# Patient Record
Sex: MALE | Race: WHITE | NOT HISPANIC OR LATINO | Employment: FULL TIME | URBAN - METROPOLITAN AREA
[De-identification: names, ages, dates, MRNs, and addresses within clinical notes are randomized per-mention and may not be internally consistent; named-entity substitution may affect disease eponyms.]

---

## 2018-02-16 ENCOUNTER — APPOINTMENT (OUTPATIENT)
Dept: URGENT CARE | Facility: CLINIC | Age: 53
End: 2018-02-16
Payer: OTHER MISCELLANEOUS

## 2018-02-16 PROCEDURE — G0381 LEV 2 HOSP TYPE B ED VISIT: HCPCS | Performed by: EMERGENCY MEDICINE

## 2018-02-16 PROCEDURE — 99282 EMERGENCY DEPT VISIT SF MDM: CPT | Performed by: EMERGENCY MEDICINE

## 2021-05-01 ENCOUNTER — IMMUNIZATION (OUTPATIENT)
Dept: IMMUNIZATION | Facility: CLINIC | Age: 56
End: 2021-05-01

## 2021-06-12 ENCOUNTER — IMMUNIZATION (OUTPATIENT)
Dept: IMMUNIZATION | Facility: CLINIC | Age: 56
End: 2021-06-12

## 2022-07-12 ENCOUNTER — HOSPITAL ENCOUNTER (EMERGENCY)
Facility: HOSPITAL | Age: 57
Discharge: HOME | End: 2022-07-12
Attending: EMERGENCY MEDICINE
Payer: OTHER MISCELLANEOUS

## 2022-07-12 ENCOUNTER — APPOINTMENT (EMERGENCY)
Dept: RADIOLOGY | Facility: HOSPITAL | Age: 57
End: 2022-07-12
Payer: OTHER MISCELLANEOUS

## 2022-07-12 VITALS
TEMPERATURE: 98 F | HEIGHT: 68 IN | OXYGEN SATURATION: 97 % | SYSTOLIC BLOOD PRESSURE: 158 MMHG | BODY MASS INDEX: 31.07 KG/M2 | DIASTOLIC BLOOD PRESSURE: 89 MMHG | WEIGHT: 205 LBS | RESPIRATION RATE: 20 BRPM | HEART RATE: 99 BPM

## 2022-07-12 DIAGNOSIS — S51.811A LACERATION OF RIGHT FOREARM, INITIAL ENCOUNTER: Primary | ICD-10-CM

## 2022-07-12 PROCEDURE — 0JQG0ZZ REPAIR RIGHT LOWER ARM SUBCUTANEOUS TISSUE AND FASCIA, OPEN APPROACH: ICD-10-PCS | Performed by: EMERGENCY MEDICINE

## 2022-07-12 PROCEDURE — 90715 TDAP VACCINE 7 YRS/> IM: CPT

## 2022-07-12 PROCEDURE — 3E0234Z INTRODUCTION OF SERUM, TOXOID AND VACCINE INTO MUSCLE, PERCUTANEOUS APPROACH: ICD-10-PCS | Performed by: EMERGENCY MEDICINE

## 2022-07-12 PROCEDURE — 90471 IMMUNIZATION ADMIN: CPT

## 2022-07-12 PROCEDURE — 63600000 HC DRUGS/DETAIL CODE

## 2022-07-12 PROCEDURE — 73090 X-RAY EXAM OF FOREARM: CPT | Mod: RT

## 2022-07-12 PROCEDURE — 12004 RPR S/N/AX/GEN/TRK7.6-12.5CM: CPT

## 2022-07-12 PROCEDURE — 99283 EMERGENCY DEPT VISIT LOW MDM: CPT | Mod: 25

## 2022-07-12 RX ADMIN — TETANUS TOXOID, REDUCED DIPHTHERIA TOXOID AND ACELLULAR PERTUSSIS VACCINE, ADSORBED 0.5 ML: 5; 2.5; 8; 8; 2.5 SUSPENSION INTRAMUSCULAR at 18:09

## 2022-07-12 ASSESSMENT — ENCOUNTER SYMPTOMS
ARTHRALGIAS: 0
WOUND: 1
NUMBNESS: 0
WEAKNESS: 0
JOINT SWELLING: 0

## 2022-07-12 NOTE — ED ATTESTATION NOTE
I have personally seen and examined the patient.  I reviewed and agree with physician assistant / nurse practitioner’s assessment and plan of care, with the following exceptions: None  My examination, assessment, and plan of care of Austin Handy is as follows:        57-year-old male suffered a laceration to his right forearm when a metal box with rounded edges fell onto his right arm.  Patient is unsure how the laceration occurred.  Patient has normal movement of his hand.  On exam patient is awake alert in no acute distress.  Patient is a large deep laceration to the dorsal surface of his right forearm.  He is neurovascularly intact distally.  X-rays were obtained which show no bony injury.  Patient's wound was cleaned and repaired by the PA.  Tetanus prophylaxis was given.  Patient will be discharged.       Bri Barnes MD  07/12/22 9771

## 2022-07-12 NOTE — DISCHARGE INSTRUCTIONS
Please follow up with Occupational Health tomorrow. Please return to the ED for any worsening, new, or concerning symptoms such as fever, chest pain, shortness of breath, abdominal pain, vomiting, signs of infection, etc    After 24 hours you may remove your dressing. Clean your wound daily with mild soap and water and dressed with topical antibiotic of your choosing.  You may also cover with bandage (adaptic, telfa, roll gauze, ace bandage).  Please be sure to change the dressing at least once a day.  Please do not submerge wound in water until after sutures are removed.  You will need your stitches removed in 10-14 days.  You may return here or have them removed at your primary care doctor's office or in urgent care.  As we discussed, we cannot guarantee there will not be a scar, and there is always a risk of retained foreign body that was not visible on your imaging today. There is also a possibility of infection or you may need additional repair/procedures.  Please be aware of any signs or symptoms of infection such as fever, redness, yellow discharge etc.  If any of these occur please seek immediate medical attention.    Please follow RICE - rest, ice, compression, elevation - protocol. Rest your extremity. You may apply ice. Please do not apply directly to the skin. Please do not leave in place while asleep. Please place for 10-15 minutes and then remove it for 10-15 minutes. You may apply a compressive ACE bandage to help reduce swelling. Please elevate your extremity above heart level to reduce swelling.

## 2022-07-12 NOTE — ED PROVIDER NOTES
Emergency Medicine Note  HPI   HISTORY OF PRESENT ILLNESS       History provided by:  Patient and medical records   used: No      57-year-old RHD male with no pertinent PMH presents to the ED via private vehicle for evaluation of right forearm laceration. Patient states a metal box fell on his right arm. He sustained a laceration to his forearm. Denies pain, numbness, weakness. Unknown last tetanus.      Patient History   PAST HISTORY     Reviewed from Nursing Triage:         History reviewed. No pertinent past medical history.    History reviewed. No pertinent surgical history.    History reviewed. No pertinent family history.    Social History     Tobacco Use   • Smoking status: Smoker, Current Status Unknown     Types: Cigarettes   Substance Use Topics   • Alcohol use: Never   • Drug use: Never         Review of Systems   REVIEW OF SYSTEMS     Review of Systems   Musculoskeletal: Negative for arthralgias and joint swelling.   Skin: Positive for wound.   Neurological: Negative for weakness and numbness.         VITALS     ED Vitals    Date/Time Temp Pulse Resp BP SpO2 [facility]   07/12/22 1534 36.7 °C (98 °F) 99 20 158/89 97 % MMM        Pulse Ox %: 97 % (07/12/22 1643)  Pulse Ox Interpretation: Normal (07/12/22 1643)  Heart Rate: 99 (07/12/22 1643)        Physical Exam   PHYSICAL EXAM     Physical Exam  Vitals and nursing note reviewed.   Constitutional:       General: He is not in acute distress.     Appearance: He is well-developed.   HENT:      Head: Atraumatic.   Eyes:      Conjunctiva/sclera: Conjunctivae normal.   Cardiovascular:      Pulses: Normal pulses.   Pulmonary:      Effort: Pulmonary effort is normal.   Musculoskeletal:         General: No deformity.      Comments: No bony tenderness. No step-off/deformity. No crepitus. ROM intact in R wrist/hand/fingers. No sign of tendon injury.   Skin:     General: Skin is warm and dry.      Comments: 10 cm gaping R forearm laceration    Neurological:      General: No focal deficit present.      Mental Status: He is alert and oriented to person, place, and time.      GCS: GCS eye subscore is 4. GCS verbal subscore is 5. GCS motor subscore is 6.      Comments: Distal NV intact.   Psychiatric:         Mood and Affect: Mood normal.         Behavior: Behavior normal.           PROCEDURES     Laceration Repair    Date/Time: 7/12/2022 6:39 PM  Performed by: Joaquina Souza PA C  Authorized by: Bri Barnes MD     Consent:     Consent obtained:  Verbal    Consent given by:  Patient    Risks, benefits, and alternatives were discussed: yes      Risks discussed:  Infection, need for additional repair, nerve damage, pain, poor cosmetic result, poor wound healing, retained foreign body, tendon damage and vascular damage    Alternatives discussed:  No treatment  Universal protocol:     Procedure explained and questions answered to patient or proxy's satisfaction: yes      Imaging studies available: yes      Patient identity confirmed:  Verbally with patient and arm band  Anesthesia:     Anesthesia method:  Local infiltration    Local anesthetic:  Lidocaine 1% w/o epi  Laceration details:     Location:  Shoulder/arm    Shoulder/arm location:  R lower arm    Length (cm):  10  Pre-procedure details:     Preparation:  Patient was prepped and draped in usual sterile fashion and imaging obtained to evaluate for foreign bodies  Exploration:     Limited defect created (wound extended): yes      Hemostasis achieved with:  Direct pressure    Imaging obtained: x-ray      Imaging outcome: foreign body not noted      Wound exploration: wound explored through full range of motion and entire depth of wound visualized      Wound extent: no foreign bodies/material noted, no tendon damage noted and no underlying fracture noted      Contaminated: no    Treatment:     Area cleansed with:  Saline, Shur-Clens and povidone-iodine    Amount of cleaning:  Standard    Irrigation  solution:  Sterile saline    Irrigation volume:  500    Irrigation method:  Syringe  Skin repair:     Repair method:  Sutures    Suture size:  4-0    Suture material:  Nylon    Suture technique:  Simple interrupted    Number of sutures:  10  Approximation:     Approximation:  Close  Repair type:     Repair type:  Simple  Post-procedure details:     Dressing:  Antibiotic ointment and non-adherent dressing    Procedure completion:  Tolerated well, no immediate complications         DATA     Results     None          Imaging Results          X-RAY FOREARM RIGHT 2 VIEWS (Final result)  Result time 07/12/22 17:20:20    Final result                 Impression:    IMPRESSION:  No acute fracture or dislocation of the right radius and ulna.  Large soft tissue laceration in the right forearm.  No radiopaque foreign body.             Narrative:    CLINICAL HISTORY: Injury.    COMMENT: 2 views of the right radius and ulna are submitted for review. There  are no prior examinations available for comparison.    There is no evidence for acute fracture or dislocation. There is no evidence for  joint effusion.  Large soft tissue laceration in the dorsal aspect of the right  forearm.                                No orders to display       Scoring tools                                 ED Course & MDM   MDM / ED COURSE / CLINICAL IMPRESSIONS / DISPO     [facility]    ED Course as of 07/12/22 1905   Tue Jul 12, 2022   1654 Impression: R forearm laceration    Plan: Xray, lac repair [SG]   1725 X-RAY FOREARM RIGHT 2 VIEWS  IMPRESSION:  No acute fracture or dislocation of the right radius and ulna.  Large soft tissue laceration in the right forearm.  No radiopaque foreign body. [SG]   1837 10, 4-0 nylon sutures placed [SG]   1845 Discharge instructions reviewed with patient. Verbalized understanding. All questions answered. Signature obtained. Patient exited department in no distress with safe and steady gait. [SG]      ED Course User  Index  [SG] Joaquina Souza PA C         Clinical Impressions as of 07/12/22 1905   Laceration of right forearm, initial encounter              Joaquina Souza PA C  07/12/22 1905